# Patient Record
Sex: FEMALE | Race: WHITE | NOT HISPANIC OR LATINO | Employment: FULL TIME | URBAN - METROPOLITAN AREA
[De-identification: names, ages, dates, MRNs, and addresses within clinical notes are randomized per-mention and may not be internally consistent; named-entity substitution may affect disease eponyms.]

---

## 2020-06-24 ENCOUNTER — TELEPHONE (OUTPATIENT)
Dept: SCHEDULING | Facility: IMAGING CENTER | Age: 60
End: 2020-06-24

## 2020-11-28 ENCOUNTER — HOSPITAL ENCOUNTER (OUTPATIENT)
Facility: MEDICAL CENTER | Age: 60
End: 2020-11-28
Attending: FAMILY MEDICINE
Payer: COMMERCIAL

## 2020-11-28 ENCOUNTER — OFFICE VISIT (OUTPATIENT)
Dept: URGENT CARE | Facility: PHYSICIAN GROUP | Age: 60
End: 2020-11-28
Payer: COMMERCIAL

## 2020-11-28 VITALS
SYSTOLIC BLOOD PRESSURE: 122 MMHG | DIASTOLIC BLOOD PRESSURE: 80 MMHG | HEIGHT: 68 IN | TEMPERATURE: 98 F | HEART RATE: 75 BPM | BODY MASS INDEX: 19.7 KG/M2 | RESPIRATION RATE: 16 BRPM | WEIGHT: 130 LBS | OXYGEN SATURATION: 98 %

## 2020-11-28 DIAGNOSIS — N30.00 ACUTE CYSTITIS WITHOUT HEMATURIA: ICD-10-CM

## 2020-11-28 LAB
APPEARANCE UR: CLEAR
BILIRUB UR STRIP-MCNC: NORMAL MG/DL
COLOR UR AUTO: YELLOW
GLUCOSE UR STRIP.AUTO-MCNC: NORMAL MG/DL
KETONES UR STRIP.AUTO-MCNC: NORMAL MG/DL
LEUKOCYTE ESTERASE UR QL STRIP.AUTO: NORMAL
NITRITE UR QL STRIP.AUTO: NORMAL
PH UR STRIP.AUTO: 5 [PH] (ref 5–8)
PROT UR QL STRIP: NORMAL MG/DL
RBC UR QL AUTO: NORMAL
SP GR UR STRIP.AUTO: 1.02
UROBILINOGEN UR STRIP-MCNC: 0.2 MG/DL

## 2020-11-28 PROCEDURE — 87086 URINE CULTURE/COLONY COUNT: CPT

## 2020-11-28 PROCEDURE — 99203 OFFICE O/P NEW LOW 30 MIN: CPT | Performed by: FAMILY MEDICINE

## 2020-11-28 PROCEDURE — 81002 URINALYSIS NONAUTO W/O SCOPE: CPT | Performed by: FAMILY MEDICINE

## 2020-11-28 RX ORDER — LOSARTAN POTASSIUM AND HYDROCHLOROTHIAZIDE 25; 100 MG/1; MG/1
1 TABLET ORAL DAILY
COMMUNITY

## 2020-11-28 RX ORDER — CEFDINIR 300 MG/1
300 CAPSULE ORAL EVERY 12 HOURS
Qty: 10 CAP | Refills: 0 | Status: SHIPPED | OUTPATIENT
Start: 2020-11-28 | End: 2020-12-03

## 2020-11-28 ASSESSMENT — ENCOUNTER SYMPTOMS
ABDOMINAL PAIN: 0
FEVER: 0
SHORTNESS OF BREATH: 0
COUGH: 0
DIARRHEA: 0
SORE THROAT: 0
HEADACHES: 0
VOMITING: 0

## 2020-11-28 ASSESSMENT — PAIN SCALES - GENERAL: PAINLEVEL: 10=SEVERE PAIN

## 2020-11-28 NOTE — PROGRESS NOTES
"Subjective:     Mariam Aguilar is a 59 y.o. female who presents for Dysuria (freq, urgecy, x1 week)    HPI  Pt presents for evaluation of a new problem   Patient with frequency and urgency for the past 1 week  Symptoms have been worsening  Symptoms are constantly present  No associated abdominal pain or back pain  No fevers, vomiting, or feeling ill systemically    Review of Systems   Constitutional: Negative for fever.   HENT: Negative for congestion and sore throat.    Respiratory: Negative for cough and shortness of breath.    Cardiovascular: Negative for chest pain.   Gastrointestinal: Negative for abdominal pain, diarrhea and vomiting.   Genitourinary: Positive for dysuria, frequency and urgency.   Skin: Negative for rash.   Neurological: Negative for headaches.       PMH: History hypertension  MEDS:   Current Outpatient Medications:   •  losartan-hydrochlorothiazide (HYZAAR) 100-25 MG per tablet, Take 1 Tab by mouth every day., Disp: , Rfl:   ALLERGIES:   Allergies   Allergen Reactions   • Codeine Rash     Pt states she get a rsash     SURGHX: None  SOCHX: No tobacco use  FH: Family history was reviewed, not contributing to acute illness     Objective:   /80   Pulse 75   Temp 36.7 °C (98 °F) (Temporal)   Resp 16   Ht 1.727 m (5' 8\")   Wt 59 kg (130 lb)   SpO2 98%   BMI 19.77 kg/m²     Physical Exam  Constitutional:       General: She is not in acute distress.     Appearance: She is well-developed. She is not diaphoretic.   HENT:      Head: Normocephalic and atraumatic.   Pulmonary:      Effort: Pulmonary effort is normal.   Abdominal:      General: Abdomen is flat. Bowel sounds are normal. There is no distension.      Palpations: Abdomen is soft.      Tenderness: There is no right CVA tenderness, left CVA tenderness, guarding or rebound.      Comments: +Mild tenderness in the suprapubic area   Skin:     General: Skin is warm and dry.      Findings: No erythema.   Neurological:      Mental Status: " She is alert and oriented to person, place, and time.   Psychiatric:         Behavior: Behavior normal.         Thought Content: Thought content normal.         Judgment: Judgment normal.         Assessment/Plan:   Assessment    1. Acute cystitis without hematuria  - cefdinir (OMNICEF) 300 MG Cap; Take 1 Cap by mouth every 12 hours for 5 days.  Dispense: 10 Cap; Refill: 0  - POCT Urinalysis  - Urine Culture; Future    Patient with acute cystitis.  Will treat with Omnicef and send urine for culture.  Follow-up culture results.

## 2020-12-01 LAB
BACTERIA UR CULT: NORMAL
SIGNIFICANT IND 70042: NORMAL
SITE SITE: NORMAL
SOURCE SOURCE: NORMAL

## 2020-12-03 ENCOUNTER — TELEPHONE (OUTPATIENT)
Dept: URGENT CARE | Facility: PHYSICIAN GROUP | Age: 60
End: 2020-12-03

## 2020-12-03 DIAGNOSIS — N30.00 ACUTE CYSTITIS WITHOUT HEMATURIA: ICD-10-CM

## 2020-12-03 NOTE — TELEPHONE ENCOUNTER
Pt called stating the abx she took upest her stomach and gave her diarrhea and she is still having dysuria and requesting a different medication for her UTI.. Please advise. Thank you! douglas    She can be reached at [phone]

## 2020-12-04 ENCOUNTER — OFFICE VISIT (OUTPATIENT)
Dept: URGENT CARE | Facility: PHYSICIAN GROUP | Age: 60
End: 2020-12-04
Payer: COMMERCIAL

## 2020-12-04 VITALS
BODY MASS INDEX: 19.25 KG/M2 | RESPIRATION RATE: 16 BRPM | HEIGHT: 68 IN | TEMPERATURE: 97.9 F | OXYGEN SATURATION: 97 % | SYSTOLIC BLOOD PRESSURE: 140 MMHG | WEIGHT: 127 LBS | DIASTOLIC BLOOD PRESSURE: 82 MMHG | HEART RATE: 69 BPM

## 2020-12-04 DIAGNOSIS — R19.7 DIARRHEA, UNSPECIFIED TYPE: ICD-10-CM

## 2020-12-04 PROCEDURE — 99214 OFFICE O/P EST MOD 30 MIN: CPT | Performed by: PHYSICIAN ASSISTANT

## 2020-12-04 RX ORDER — DICYCLOMINE HYDROCHLORIDE 10 MG/1
10 CAPSULE ORAL
Qty: 40 CAP | Refills: 0 | Status: SHIPPED | OUTPATIENT
Start: 2020-12-04

## 2020-12-04 RX ORDER — NITROFURANTOIN 25; 75 MG/1; MG/1
100 CAPSULE ORAL 2 TIMES DAILY
Qty: 10 CAP | Refills: 0 | Status: SHIPPED | OUTPATIENT
Start: 2020-12-04 | End: 2020-12-04

## 2020-12-04 ASSESSMENT — ENCOUNTER SYMPTOMS
FLANK PAIN: 0
BLOOD IN STOOL: 0
FEVER: 0
VOMITING: 0
DIARRHEA: 1
ABDOMINAL PAIN: 0
CONSTIPATION: 0
CHILLS: 0

## 2020-12-04 NOTE — TELEPHONE ENCOUNTER
New abx sent to pharmacy.  If not fully resolving with this one, she should come back to be seen in person.  Thanks!

## 2020-12-05 NOTE — PROGRESS NOTES
"  Subjective:   Mariam Aguilar is a 60 y.o. female who presents today with   Chief Complaint   Patient presents with   • Diarrhea     x6 days       Diarrhea   This is a new problem. The current episode started in the past 7 days. The problem occurs 5 to 10 times per day. The problem has been unchanged. The stool consistency is described as watery. The patient states that diarrhea does not awaken her from sleep. Pertinent negatives include no abdominal pain, chills, fever or vomiting. Risk factors include recent antibiotic use. Treatments tried: Kaopectate and Imodium. The treatment provided no relief.     Patient denies any previous GI symptoms.  She was treated with cefdinir for UTI last week. Finished out the abx.  PMH:  has no past medical history on file.  MEDS:   Current Outpatient Medications:   •  dicyclomine (BENTYL) 10 MG Cap, Take 1 Cap by mouth 4 Times a Day,Before Meals and at Bedtime., Disp: 40 Cap, Rfl: 0  •  losartan-hydrochlorothiazide (HYZAAR) 100-25 MG per tablet, Take 1 Tab by mouth every day., Disp: , Rfl:   ALLERGIES:   Allergies   Allergen Reactions   • Codeine Rash     Pt states she get a rsash     SURGHX: No past surgical history on file.  SOCHX:  Patient works at [employer].  FH: Reviewed with patient, not pertinent to this visit.       Review of Systems   Constitutional: Negative for chills and fever.   Gastrointestinal: Positive for diarrhea. Negative for abdominal pain, blood in stool, constipation and vomiting.   Genitourinary: Positive for dysuria (improved). Negative for flank pain, frequency, hematuria and urgency.   All other systems reviewed and are negative.       Objective:   /82   Pulse 69   Temp 36.6 °C (97.9 °F) (Temporal)   Resp 16   Ht 1.727 m (5' 8\")   Wt 57.6 kg (127 lb)   SpO2 97%   BMI 19.31 kg/m²   Physical Exam  Vitals signs and nursing note reviewed.   Constitutional:       General: She is not in acute distress.     Appearance: Normal appearance. She is " well-developed. She is not ill-appearing or toxic-appearing.   HENT:      Head: Normocephalic and atraumatic.      Right Ear: Hearing normal.      Left Ear: Hearing normal.   Eyes:      Pupils: Pupils are equal, round, and reactive to light.   Cardiovascular:      Rate and Rhythm: Normal rate and regular rhythm.      Heart sounds: Normal heart sounds.   Pulmonary:      Effort: Pulmonary effort is normal.      Breath sounds: Normal breath sounds.   Abdominal:      General: Bowel sounds are increased. There is no distension.      Palpations: There is no shifting dullness.      Tenderness: There is no abdominal tenderness. There is no right CVA tenderness or left CVA tenderness. Negative signs include Maurer's sign and McBurney's sign.   Musculoskeletal:      Comments: Normal movement in all 4 extremities   Skin:     General: Skin is warm and dry.   Neurological:      Mental Status: She is alert.      Coordination: Coordination normal.   Psychiatric:         Mood and Affect: Mood normal.           Assessment/Plan:   Assessment    1. Diarrhea, unspecified type  - CULTURE STOOL; Future  - dicyclomine (BENTYL) 10 MG Cap; Take 1 Cap by mouth 4 Times a Day,Before Meals and at Bedtime.  Dispense: 40 Cap; Refill: 0  Encouraged probiotic usage.  Given that patient's urine culture was negative suggest that she not take another round of antibiotics.  Encouraged her to come back in for stool culture on Monday if her diarrhea persists.  [place] diet discussed. Patient asking for prescription to help slow down diarrhea. She had COVID test from work yesterday.  Diarrhea most likely caused from antibiotics.  Inconsistent with C. difficile at this time given that diarrhea is watery rather than yellow/mucus.  Diarrhea also does not wake her up at night.  Encourage close monitoring and will follow up with patient later this weekend.  Encouraged increased fluid intake.  Differential diagnosis, natural history, supportive care, and  indications for immediate follow-up discussed.   Called 12/7/20 and patient is doing better. Symptoms improving.  Patient given instructions and understanding of medications and treatment.    If not improving in 3-5 days, F/U with PCP or return to UC if symptoms worsen.    Patient agreeable to plan.      Please note that this dictation was created using voice recognition software. I have made every reasonable attempt to correct obvious errors, but I expect that there are errors of grammar and possibly content that I did not discover before finalizing the note.    Rodney Love PA-C